# Patient Record
Sex: FEMALE | Race: BLACK OR AFRICAN AMERICAN | NOT HISPANIC OR LATINO | URBAN - METROPOLITAN AREA
[De-identification: names, ages, dates, MRNs, and addresses within clinical notes are randomized per-mention and may not be internally consistent; named-entity substitution may affect disease eponyms.]

---

## 2018-08-10 ENCOUNTER — OFFICE (INPATIENT)
Dept: URBAN - METROPOLITAN AREA CLINIC 33 | Facility: CLINIC | Age: 48
End: 2018-08-10

## 2018-08-10 PROBLEM — K20.9 ESOPHAGITIS, UNSPECIFIED: Status: ACTIVE | Noted: 2018-08-10

## 2018-08-10 PROBLEM — R93.3 ABNORMAL FINDINGS ON DX IMAGING OF PRT DIGESTIVE TRACT: Status: ACTIVE | Noted: 2018-08-10

## 2018-08-10 PROBLEM — K57.30 DVRTCLOS OF LG INT W/O PERFORATION OR ABSCESS W/O BLEEDING: Status: ACTIVE | Noted: 2018-08-10

## 2018-08-10 PROBLEM — B37.81 CANDIDAL ESOPHAGITIS: Status: ACTIVE | Noted: 2018-08-10

## 2018-08-10 PROBLEM — K31.89 OTHER DISEASES OF STOMACH AND DUODENUM: Status: ACTIVE | Noted: 2018-08-10

## 2018-08-10 PROCEDURE — 00031: CPT

## 2018-09-10 ENCOUNTER — OFFICE (INPATIENT)
Dept: URBAN - METROPOLITAN AREA CLINIC 78 | Facility: CLINIC | Age: 48
End: 2018-09-10

## 2018-09-10 VITALS
TEMPERATURE: 98.1 F | WEIGHT: 202 LBS | DIASTOLIC BLOOD PRESSURE: 79 MMHG | SYSTOLIC BLOOD PRESSURE: 129 MMHG | HEART RATE: 76 BPM | HEIGHT: 63 IN

## 2018-09-10 DIAGNOSIS — K64.1 SECOND DEGREE HEMORRHOIDS: ICD-10-CM

## 2018-09-10 DIAGNOSIS — K92.1 MELENA: ICD-10-CM

## 2018-09-10 PROCEDURE — 46221 LIGATION OF HEMORRHOID(S): CPT

## 2018-11-16 ENCOUNTER — OFFICE (INPATIENT)
Dept: URBAN - METROPOLITAN AREA CLINIC 33 | Facility: CLINIC | Age: 48
End: 2018-11-16

## 2018-11-16 VITALS
HEIGHT: 63 IN | HEART RATE: 107 BPM | SYSTOLIC BLOOD PRESSURE: 116 MMHG | TEMPERATURE: 97.5 F | DIASTOLIC BLOOD PRESSURE: 75 MMHG | WEIGHT: 208 LBS

## 2018-11-16 DIAGNOSIS — K64.1 SECOND DEGREE HEMORRHOIDS: ICD-10-CM

## 2018-11-16 DIAGNOSIS — K92.1 MELENA: ICD-10-CM

## 2018-11-16 PROCEDURE — 46221 LIGATION OF HEMORRHOID(S): CPT

## 2019-05-16 ENCOUNTER — OFFICE (INPATIENT)
Dept: URBAN - METROPOLITAN AREA CLINIC 33 | Facility: CLINIC | Age: 49
End: 2019-05-16

## 2019-05-16 VITALS — WEIGHT: 216 LBS | HEIGHT: 63 IN

## 2019-05-16 DIAGNOSIS — K21.9 GASTRO-ESOPHAGEAL REFLUX DISEASE WITHOUT ESOPHAGITIS: ICD-10-CM

## 2019-05-16 DIAGNOSIS — K92.1 MELENA: ICD-10-CM

## 2019-05-16 DIAGNOSIS — R93.3 ABNORMAL FINDINGS ON DIAGNOSTIC IMAGING OF OTHER PARTS OF DI: ICD-10-CM

## 2019-05-16 DIAGNOSIS — D64.9 ANEMIA, UNSPECIFIED: ICD-10-CM

## 2019-05-16 PROCEDURE — 99214 OFFICE O/P EST MOD 30 MIN: CPT

## 2019-05-16 RX ORDER — PANTOPRAZOLE SODIUM 40 MG/1
TABLET, DELAYED RELEASE ORAL
Qty: 60 | Refills: 5 | Status: COMPLETED
Start: 2019-05-16 | End: 2020-07-21

## 2019-05-16 NOTE — SERVICEHPINOTES
MAU ARNOLD   is a   48  female who presents for ER followup. Went to ER on 05/14/19 with left sided abdominal pain. The CT of abdomen and pelvis showed no definite acute intra-abdominal pathology. Colonic diverticulosis without acute diverticulitis, mild pancreatic ductal dilation, non specific but more pronounced when compared to July 2018.  MRCP is scheduled per PCP. BRSevere anemia H/H 6.7/23.1. Hx of pica, 20 years ago, recently had cravings, ordered edible dirt online and eats a tablespoon per day. BRHad pain at [facility] and left sided abdomen. + Fullness. The anemia is new. + headache, dizziness, fatigue and sob. + Nausea without vomiting.  Denies further abdominal pain. BR+ Acid reflux, not on any PPI. Took Advil migraine yesterday and took one dose of Excedrin today. Denies taking NSAIDs regularly.   Bowel movements are normal, daily on BSS type 6, usually once daily. BRSeen blood after wipe with clots. Denies bloody diarrhea. Red meat seems to aggravate the rectal bleeding. If stops eating the meat, no rectal bleeding. Last episode of rectal bleeding, a week ago. BRDenies melena or weight loss. Last colonoscopy and EGD was done in 08/2018. EGD showed candida esophagitis and mild gastritis. Hemorrhoid banding after the colonoscopy. BRDenies heavy menstruation.

## 2019-05-22 ENCOUNTER — OFFICE (INPATIENT)
Dept: URBAN - METROPOLITAN AREA CLINIC 32 | Facility: CLINIC | Age: 49
End: 2019-05-22
Payer: COMMERCIAL

## 2019-05-22 VITALS
RESPIRATION RATE: 16 BRPM | SYSTOLIC BLOOD PRESSURE: 101 MMHG | SYSTOLIC BLOOD PRESSURE: 121 MMHG | HEART RATE: 94 BPM | SYSTOLIC BLOOD PRESSURE: 130 MMHG | TEMPERATURE: 97.7 F | DIASTOLIC BLOOD PRESSURE: 89 MMHG | WEIGHT: 216 LBS | HEIGHT: 63 IN | OXYGEN SATURATION: 94 % | OXYGEN SATURATION: 98 % | SYSTOLIC BLOOD PRESSURE: 121 MMHG | HEART RATE: 87 BPM | DIASTOLIC BLOOD PRESSURE: 72 MMHG | OXYGEN SATURATION: 98 % | SYSTOLIC BLOOD PRESSURE: 121 MMHG | SYSTOLIC BLOOD PRESSURE: 158 MMHG | TEMPERATURE: 97.9 F | WEIGHT: 216 LBS | OXYGEN SATURATION: 100 % | RESPIRATION RATE: 11 BRPM | DIASTOLIC BLOOD PRESSURE: 72 MMHG | DIASTOLIC BLOOD PRESSURE: 78 MMHG | DIASTOLIC BLOOD PRESSURE: 89 MMHG | TEMPERATURE: 97.9 F | DIASTOLIC BLOOD PRESSURE: 72 MMHG | HEART RATE: 70 BPM | RESPIRATION RATE: 16 BRPM | OXYGEN SATURATION: 100 % | OXYGEN SATURATION: 98 % | HEART RATE: 70 BPM | HEART RATE: 94 BPM | SYSTOLIC BLOOD PRESSURE: 101 MMHG | HEIGHT: 63 IN | TEMPERATURE: 97.7 F | DIASTOLIC BLOOD PRESSURE: 78 MMHG | HEART RATE: 61 BPM | RESPIRATION RATE: 10 BRPM | SYSTOLIC BLOOD PRESSURE: 158 MMHG | OXYGEN SATURATION: 94 % | HEART RATE: 70 BPM | DIASTOLIC BLOOD PRESSURE: 89 MMHG | DIASTOLIC BLOOD PRESSURE: 78 MMHG | SYSTOLIC BLOOD PRESSURE: 101 MMHG | RESPIRATION RATE: 16 BRPM | HEART RATE: 87 BPM | TEMPERATURE: 97.7 F | HEART RATE: 61 BPM | SYSTOLIC BLOOD PRESSURE: 118 MMHG | HEART RATE: 87 BPM | RESPIRATION RATE: 10 BRPM | OXYGEN SATURATION: 96 % | OXYGEN SATURATION: 94 % | RESPIRATION RATE: 10 BRPM | TEMPERATURE: 97.9 F | OXYGEN SATURATION: 96 % | HEIGHT: 63 IN | HEART RATE: 94 BPM | OXYGEN SATURATION: 100 % | DIASTOLIC BLOOD PRESSURE: 50 MMHG | WEIGHT: 216 LBS | SYSTOLIC BLOOD PRESSURE: 118 MMHG | SYSTOLIC BLOOD PRESSURE: 158 MMHG | RESPIRATION RATE: 11 BRPM | SYSTOLIC BLOOD PRESSURE: 130 MMHG | OXYGEN SATURATION: 96 % | SYSTOLIC BLOOD PRESSURE: 130 MMHG | HEART RATE: 61 BPM | RESPIRATION RATE: 11 BRPM | DIASTOLIC BLOOD PRESSURE: 50 MMHG | SYSTOLIC BLOOD PRESSURE: 118 MMHG | DIASTOLIC BLOOD PRESSURE: 50 MMHG

## 2019-05-22 DIAGNOSIS — R10.13 EPIGASTRIC PAIN: ICD-10-CM

## 2019-05-22 DIAGNOSIS — D64.9 ANEMIA, UNSPECIFIED: ICD-10-CM

## 2019-05-22 PROBLEM — K44.9 DIAPHRAGMATIC HERNIA WITHOUT OBSTRUCTION OR GANGRENE: Status: ACTIVE | Noted: 2019-05-22

## 2019-05-22 PROCEDURE — 43235 EGD DIAGNOSTIC BRUSH WASH: CPT

## 2019-05-22 NOTE — SERVICEHPINOTES
Pt presents for EGD due to anemia (acute blood loss) and abdominal pain. Previous duodenal and gastric biopsies were unremarkable.

## 2019-07-03 ENCOUNTER — OFFICE (INPATIENT)
Dept: URBAN - METROPOLITAN AREA CLINIC 33 | Facility: CLINIC | Age: 49
End: 2019-07-03
Payer: COMMERCIAL

## 2019-07-03 DIAGNOSIS — D50.9 IRON DEFICIENCY ANEMIA, UNSPECIFIED: ICD-10-CM

## 2019-07-03 PROCEDURE — 91110 GI TRC IMG INTRAL ESOPH-ILE: CPT

## 2020-03-13 ENCOUNTER — INPATIENT HOSPITAL (INPATIENT)
Dept: URBAN - METROPOLITAN AREA HOSPITAL 62 | Facility: HOSPITAL | Age: 50
End: 2020-03-13
Payer: COMMERCIAL

## 2020-03-13 DIAGNOSIS — R93.3 ABNORMAL FINDINGS ON DIAGNOSTIC IMAGING OF OTHER PARTS OF DI: ICD-10-CM

## 2020-03-13 DIAGNOSIS — R10.31 RIGHT LOWER QUADRANT PAIN: ICD-10-CM

## 2020-03-13 DIAGNOSIS — K92.1 MELENA: ICD-10-CM

## 2020-03-13 DIAGNOSIS — D50.9 IRON DEFICIENCY ANEMIA, UNSPECIFIED: ICD-10-CM

## 2020-03-13 DIAGNOSIS — R11.2 NAUSEA WITH VOMITING, UNSPECIFIED: ICD-10-CM

## 2020-03-13 DIAGNOSIS — K57.32 DIVERTICULITIS OF LARGE INTESTINE WITHOUT PERFORATION OR ABS: ICD-10-CM

## 2020-03-13 PROCEDURE — 99254 IP/OBS CNSLTJ NEW/EST MOD 60: CPT | Performed by: INTERNAL MEDICINE

## 2020-03-14 PROCEDURE — 99232 SBSQ HOSP IP/OBS MODERATE 35: CPT | Performed by: INTERNAL MEDICINE

## 2020-06-01 ENCOUNTER — TELEHEALTH PROVIDED OTHER THAN IN PATIENT'S HOME (INPATIENT)
Dept: URBAN - METROPOLITAN AREA CLINIC 34 | Facility: CLINIC | Age: 50
End: 2020-06-01

## 2020-06-01 VITALS — WEIGHT: 200 LBS | HEIGHT: 63 IN

## 2020-06-01 DIAGNOSIS — K92.1 MELENA: ICD-10-CM

## 2020-06-01 DIAGNOSIS — K57.33 DIVERTICULITIS OF LARGE INTESTINE WITHOUT PERFORATION OR ABS: ICD-10-CM

## 2020-06-01 DIAGNOSIS — D50.9 IRON DEFICIENCY ANEMIA, UNSPECIFIED: ICD-10-CM

## 2020-06-01 DIAGNOSIS — R93.3 ABNORMAL FINDINGS ON DIAGNOSTIC IMAGING OF OTHER PARTS OF DI: ICD-10-CM

## 2020-06-01 DIAGNOSIS — R10.9 UNSPECIFIED ABDOMINAL PAIN: ICD-10-CM

## 2020-06-01 PROCEDURE — 99214 OFFICE O/P EST MOD 30 MIN: CPT | Mod: 95 | Performed by: NURSE PRACTITIONER

## 2020-06-01 NOTE — SERVICEHPINOTES
PATIENT VERIFIED BY DATE OF BIRTH AND NAME. Patient has been consented for this telecommunication visit. Hospitalized in March with diverticulitis/colitis on the right sided abdomen. Treated with IV antibiotics and discharged with oral antibiotics. During the hospitalization, received 2 Units of RBC and 2 units of iron transfusions. Hx of chronic iron deficiency anemia.  Has been following up with hematologist and had 2 more iron infusions, last week told to have stable anemia. BRHas been experiencing left sided abdominal pain approx. 2-3 times a week. Started having rectal bleeding again. Hx of occasional chronic rectal bleeding. PT has been taking meloxicam on and off for back and joint pain. The rectal bleeding got better once stopped the meloxicam. BRMoves bowel daily. The rectal bleeding comes without having BMs when sits down on the toilet bowl.BRDenies bloody diarrhea, constipation or weight loss. BRDenies nausea, vomiting, dysphagia, acid reflux, dyspepsia or early satiety.   ROS as described above, all others negative.

## 2020-06-01 NOTE — SERVICENOTES
I have reviewed the history, physical exam, assessment and management plans.  I concur with or have edited all elements of her note.
Patient's visit was conducted through [employer] telecommunication. Patient consented before the start of visit as to understanding of privacy concerns, possible technological failure, and their responsibility of carrying out instructions of plan.

## 2020-06-30 ENCOUNTER — TELEHEALTH PROVIDED OTHER THAN IN PATIENT'S HOME (INPATIENT)
Dept: URBAN - METROPOLITAN AREA TELEHEALTH 3 | Facility: TELEHEALTH | Age: 50
End: 2020-06-30

## 2020-06-30 VITALS — HEIGHT: 63 IN | WEIGHT: 200 LBS

## 2020-06-30 DIAGNOSIS — K92.1 MELENA: ICD-10-CM

## 2020-06-30 DIAGNOSIS — R93.3 ABNORMAL FINDINGS ON DIAGNOSTIC IMAGING OF OTHER PARTS OF DI: ICD-10-CM

## 2020-06-30 DIAGNOSIS — D50.9 IRON DEFICIENCY ANEMIA, UNSPECIFIED: ICD-10-CM

## 2020-06-30 PROCEDURE — 99214 OFFICE O/P EST MOD 30 MIN: CPT | Mod: 95 | Performed by: PHYSICIAN ASSISTANT

## 2020-06-30 NOTE — SERVICEHPINOTES
PATIENT VERIFIED BY DATE OF BIRTH AND NAME. Patient has been consented for this telecommunication visit. 48 yo black female presents for f/u diverticulitis/colitis. She is primarily needing to have some paperwork filled out for work. She feels a need to continue to work from home given her asthma, chronic anemia, and h/o GI problems. Her commute would require public transportation and she is concerned about COVID-19. In terms of her GI system, her recent CT showed resolution of the prior abnormal findings. She is to be getting scheduled for a colonoscopy to follow up but is awaiting a call back from our office. She has chronic rectal bleeding (was worse with meloxicam use) and has continued to have some intermittent abdominal pains. Reports a daily BM. Denies bloody diarrhea, constipation or weight loss. Denies nausea, vomiting, dysphagia, acid reflux, dyspepsia or early satiety.    ROS as described above, all others except for leg pains for which she has seen her PCP. Prior hx: Hospitalized in March with diverticulitis/colitis on the right sided abdomen. Treated with IV antibiotics and discharged with oral antibiotics. During the hospitalization, received 2 Units of RBC and 2 units of iron transfusions. Hx of chronic iron deficiency anemia. Has been following up with hematologist and had 2 more iron infusions.BR

## 2020-07-14 ENCOUNTER — OFFICE (INPATIENT)
Dept: URBAN - METROPOLITAN AREA CLINIC 34 | Facility: CLINIC | Age: 50
End: 2020-07-14
Payer: COMMERCIAL

## 2020-07-14 DIAGNOSIS — Z11.59 ENCOUNTER FOR SCREENING FOR OTHER VIRAL DISEASES: ICD-10-CM

## 2020-07-14 PROCEDURE — 99211 OFF/OP EST MAY X REQ PHY/QHP: CPT | Mod: 25,CS | Performed by: INTERNAL MEDICINE

## 2020-07-21 ENCOUNTER — OFFICE (INPATIENT)
Dept: URBAN - METROPOLITAN AREA CLINIC 30 | Facility: CLINIC | Age: 50
End: 2020-07-21
Payer: COMMERCIAL

## 2020-07-21 VITALS
HEART RATE: 75 BPM | DIASTOLIC BLOOD PRESSURE: 77 MMHG | OXYGEN SATURATION: 100 % | WEIGHT: 200 LBS | SYSTOLIC BLOOD PRESSURE: 105 MMHG | OXYGEN SATURATION: 94 % | TEMPERATURE: 97.9 F | HEART RATE: 95 BPM | RESPIRATION RATE: 14 BRPM | HEART RATE: 77 BPM | DIASTOLIC BLOOD PRESSURE: 67 MMHG | DIASTOLIC BLOOD PRESSURE: 60 MMHG | RESPIRATION RATE: 18 BRPM | OXYGEN SATURATION: 99 % | HEART RATE: 82 BPM | OXYGEN SATURATION: 98 % | SYSTOLIC BLOOD PRESSURE: 130 MMHG | SYSTOLIC BLOOD PRESSURE: 100 MMHG | HEART RATE: 96 BPM | RESPIRATION RATE: 12 BRPM | DIASTOLIC BLOOD PRESSURE: 59 MMHG | DIASTOLIC BLOOD PRESSURE: 58 MMHG | RESPIRATION RATE: 11 BRPM | RESPIRATION RATE: 16 BRPM | OXYGEN SATURATION: 96 % | HEIGHT: 63 IN | SYSTOLIC BLOOD PRESSURE: 103 MMHG | SYSTOLIC BLOOD PRESSURE: 113 MMHG | TEMPERATURE: 97.8 F

## 2020-07-21 DIAGNOSIS — R93.3 ABNORMAL FINDINGS ON DIAGNOSTIC IMAGING OF OTHER PARTS OF DI: ICD-10-CM

## 2020-07-21 DIAGNOSIS — K57.30 DIVERTICULOSIS OF LARGE INTESTINE WITHOUT PERFORATION OR ABS: ICD-10-CM

## 2020-09-18 ENCOUNTER — INPATIENT HOSPITAL (INPATIENT)
Dept: URBAN - METROPOLITAN AREA HOSPITAL 13 | Facility: HOSPITAL | Age: 50
End: 2020-09-18
Payer: COMMERCIAL

## 2020-09-18 DIAGNOSIS — K57.33 DIVERTICULITIS OF LARGE INTESTINE WITHOUT PERFORATION OR ABS: ICD-10-CM

## 2020-09-18 DIAGNOSIS — D50.9 IRON DEFICIENCY ANEMIA, UNSPECIFIED: ICD-10-CM

## 2020-09-18 DIAGNOSIS — K92.1 MELENA: ICD-10-CM

## 2020-09-18 DIAGNOSIS — D64.9 ANEMIA, UNSPECIFIED: ICD-10-CM

## 2020-09-18 PROCEDURE — 43235 EGD DIAGNOSTIC BRUSH WASH: CPT | Performed by: INTERNAL MEDICINE

## 2020-09-18 PROCEDURE — 45330 DIAGNOSTIC SIGMOIDOSCOPY: CPT | Performed by: INTERNAL MEDICINE

## 2020-09-18 PROCEDURE — 99254 IP/OBS CNSLTJ NEW/EST MOD 60: CPT | Mod: 25 | Performed by: INTERNAL MEDICINE

## 2020-09-23 PROBLEM — K64.8 OTHER HEMORRHOIDS: Status: ACTIVE | Noted: 2018-08-10

## 2020-09-23 PROBLEM — K64.4 RESIDUAL HEMORRHOIDAL SKIN TAGS: Status: ACTIVE | Noted: 2018-08-10

## 2021-07-06 ENCOUNTER — TELEHEALTH PROVIDED OTHER THAN IN PATIENT'S HOME (INPATIENT)
Dept: URBAN - METROPOLITAN AREA TELEHEALTH 12 | Facility: TELEHEALTH | Age: 51
End: 2021-07-06

## 2021-07-06 VITALS — WEIGHT: 212 LBS | HEIGHT: 63 IN

## 2021-07-06 DIAGNOSIS — K57.33 DIVERTICULITIS OF LARGE INTESTINE WITHOUT PERFORATION OR ABS: ICD-10-CM

## 2021-07-06 DIAGNOSIS — R10.32 LEFT LOWER QUADRANT PAIN: ICD-10-CM

## 2021-07-06 PROCEDURE — 99214 OFFICE O/P EST MOD 30 MIN: CPT | Mod: 95 | Performed by: PHYSICIAN ASSISTANT

## 2021-07-06 NOTE — SERVICEHPINOTES
PATIENT VERIFIED BY DATE OF BIRTH AND NAME. Patient has been consented for this telecommunication visit.   She has an extensive hx of diverticulitis. She has had previous surgery. She had a most recent episode a few weeks ago. She had been on 8 days of amoxicillin previous to the diverticulitis attack for an ear infection. The doctor in the ER wouldn't give her an additional abx. She continues to have pain which is mild. If she tries to increase her diet, she has more pain. She had imaging that showed diverticulitis in the transverse colon. Mild discomfort with palp in the LLQ at times. No fever or chills. Overall, symptoms have improved and are mild.  No NSAID use. BMI is 37.  Colonoscopy 07/2020 by Dr. Ruiz showed moderate diverticulosis in the whole colon and evidence of previous sx in the sigmoid colon. ROS as per HPI and o/w is unremarkable